# Patient Record
Sex: MALE | URBAN - METROPOLITAN AREA
[De-identification: names, ages, dates, MRNs, and addresses within clinical notes are randomized per-mention and may not be internally consistent; named-entity substitution may affect disease eponyms.]

---

## 2023-01-26 ENCOUNTER — APPOINTMENT (OUTPATIENT)
Dept: URBAN - METROPOLITAN AREA CLINIC 245 | Age: 73
Setting detail: DERMATOLOGY
End: 2023-01-27

## 2023-01-26 DIAGNOSIS — L30.0 NUMMULAR DERMATITIS: ICD-10-CM

## 2023-01-26 DIAGNOSIS — L82.1 OTHER SEBORRHEIC KERATOSIS: ICD-10-CM

## 2023-01-26 DIAGNOSIS — L57.8 OTHER SKIN CHANGES DUE TO CHRONIC EXPOSURE TO NONIONIZING RADIATION: ICD-10-CM

## 2023-01-26 DIAGNOSIS — D49.2 NEOPLASM OF UNSPECIFIED BEHAVIOR OF BONE, SOFT TISSUE, AND SKIN: ICD-10-CM

## 2023-01-26 DIAGNOSIS — M71 OTHER BURSOPATHIES: ICD-10-CM

## 2023-01-26 DIAGNOSIS — D18.0 HEMANGIOMA: ICD-10-CM

## 2023-01-26 PROBLEM — M71.372 OTHER BURSAL CYST, LEFT ANKLE AND FOOT: Status: ACTIVE | Noted: 2023-01-26

## 2023-01-26 PROBLEM — D18.01 HEMANGIOMA OF SKIN AND SUBCUTANEOUS TISSUE: Status: ACTIVE | Noted: 2023-01-26

## 2023-01-26 PROCEDURE — OTHER SHAVE REMOVAL: OTHER

## 2023-01-26 PROCEDURE — 99204 OFFICE O/P NEW MOD 45 MIN: CPT | Mod: 25

## 2023-01-26 PROCEDURE — OTHER COUNSELING: OTHER

## 2023-01-26 PROCEDURE — OTHER MEDICATION COUNSELING: OTHER

## 2023-01-26 PROCEDURE — A4550 SURGICAL TRAYS: HCPCS

## 2023-01-26 PROCEDURE — OTHER PRESCRIPTION: OTHER

## 2023-01-26 PROCEDURE — OTHER MIPS QUALITY: OTHER

## 2023-01-26 PROCEDURE — 11310 SHAVE SKIN LESION 0.5 CM/<: CPT

## 2023-01-26 PROCEDURE — 11311 SHAVE SKIN LESION 0.6-1.0 CM: CPT

## 2023-01-26 RX ORDER — TRIAMCINOLONE ACETONIDE 1 MG/G
CREAM TOPICAL BID
Qty: 454 | Refills: 3 | Status: ERX | COMMUNITY
Start: 2023-01-26

## 2023-01-26 ASSESSMENT — LOCATION ZONE DERM
LOCATION ZONE: TRUNK
LOCATION ZONE: TOE
LOCATION ZONE: FACE
LOCATION ZONE: LEG

## 2023-01-26 ASSESSMENT — LOCATION DETAILED DESCRIPTION DERM
LOCATION DETAILED: INFERIOR MID FOREHEAD
LOCATION DETAILED: RIGHT LATERAL BUCCAL CHEEK
LOCATION DETAILED: RIGHT CENTRAL TEMPLE
LOCATION DETAILED: INFERIOR THORACIC SPINE
LOCATION DETAILED: RIGHT DISTAL CALF
LOCATION DETAILED: EPIGASTRIC SKIN
LOCATION DETAILED: LEFT DORSAL GREAT TOE

## 2023-01-26 ASSESSMENT — LOCATION SIMPLE DESCRIPTION DERM
LOCATION SIMPLE: RIGHT TEMPLE
LOCATION SIMPLE: RIGHT CALF
LOCATION SIMPLE: INFERIOR FOREHEAD
LOCATION SIMPLE: UPPER BACK
LOCATION SIMPLE: ABDOMEN
LOCATION SIMPLE: RIGHT CHEEK
LOCATION SIMPLE: LEFT GREAT TOE

## 2023-01-26 NOTE — PROCEDURE: SHAVE REMOVAL
Hemostasis: Aluminum Chloride and Electrocautery
Add Variable For Additional Medical Justification: No
Render Post-Care Instructions In Note?: yes
Biopsy Method: Dermablade
Size Of Lesion In Cm (Required): 0.6
Size Of Lesion In Cm (Required): 0.2
X Size Of Lesion In Cm (Optional): 0
Body Location Override (Optional - Billing Will Still Be Based On Selected Body Map Location If Applicable): right temple
Path Notes (To The Dermatopathologist): Check margins
Anesthesia Type: 1% lidocaine with epinephrine
Depth Of Shave: dermis
Consent was obtained from the patient. The risks and benefits to therapy were discussed in detail. Specifically, the risks of infection, scarring, bleeding, prolonged wound healing, incomplete removal, allergy to anesthesia, nerve injury and recurrence were addressed. Prior to the procedure, the treatment site was clearly identified and confirmed by the patient. All components of Universal Protocol/PAUSE Rule completed.
Detail Level: Detailed
Billing Type: Third-Party Bill
Medical Necessity Clause: This procedure was medically necessary because the lesion that was treated was:
Medical Necessity Information: It is in your best interest to select a reason for this procedure from the list below. All of these items fulfill various CMS LCD requirements except the new and changing color options.
Notification Instructions: Patient will be notified of pathology results which on average takes 2 weeks.
Wound Care: Petrolatum
Post-Care Instructions: I reviewed with the patient in detail post-care instructions. Patient is to keep the biopsy site dry overnight, and then apply Vaseline or Aquaphor twice daily until healed. Wash area daily with soap and water. Call with any concerns with the way the site looks or feels.
Anesthesia Volume In Cc: 1
Body Location Override (Optional - Billing Will Still Be Based On Selected Body Map Location If Applicable): right lower jawline

## 2023-03-06 ENCOUNTER — APPOINTMENT (OUTPATIENT)
Dept: URBAN - METROPOLITAN AREA CLINIC 245 | Age: 73
Setting detail: DERMATOLOGY
End: 2023-03-07

## 2023-03-06 DIAGNOSIS — L85.3 XEROSIS CUTIS: ICD-10-CM

## 2023-03-06 DIAGNOSIS — L30.0 NUMMULAR DERMATITIS: ICD-10-CM

## 2023-03-06 DIAGNOSIS — L82.0 INFLAMED SEBORRHEIC KERATOSIS: ICD-10-CM

## 2023-03-06 DIAGNOSIS — L738 OTHER SPECIFIED DISEASES OF HAIR AND HAIR FOLLICLES: ICD-10-CM

## 2023-03-06 DIAGNOSIS — L82.1 OTHER SEBORRHEIC KERATOSIS: ICD-10-CM

## 2023-03-06 DIAGNOSIS — M71 OTHER BURSOPATHIES: ICD-10-CM

## 2023-03-06 DIAGNOSIS — L663 OTHER SPECIFIED DISEASES OF HAIR AND HAIR FOLLICLES: ICD-10-CM

## 2023-03-06 PROBLEM — L02.222 FURUNCLE OF BACK [ANY PART, EXCEPT BUTTOCK]: Status: ACTIVE | Noted: 2023-03-06

## 2023-03-06 PROBLEM — M71.372 OTHER BURSAL CYST, LEFT ANKLE AND FOOT: Status: ACTIVE | Noted: 2023-03-06

## 2023-03-06 PROCEDURE — OTHER MEDICATION COUNSELING: OTHER

## 2023-03-06 PROCEDURE — 10060 I&D ABSCESS SIMPLE/SINGLE: CPT

## 2023-03-06 PROCEDURE — OTHER INCISION AND DRAINAGE: OTHER

## 2023-03-06 PROCEDURE — 99214 OFFICE O/P EST MOD 30 MIN: CPT | Mod: 25

## 2023-03-06 PROCEDURE — OTHER PRESCRIPTION: OTHER

## 2023-03-06 PROCEDURE — OTHER PRESCRIPTION MEDICATION MANAGEMENT: OTHER

## 2023-03-06 PROCEDURE — OTHER LIQUID NITROGEN: OTHER

## 2023-03-06 PROCEDURE — 17110 DESTRUCT B9 LESION 1-14: CPT

## 2023-03-06 PROCEDURE — OTHER COUNSELING: OTHER

## 2023-03-06 RX ORDER — CLINDAMYCIN PHOSPHATE 10 MG/ML
LOTION TOPICAL
Qty: 60 | Refills: 5 | Status: ERX | COMMUNITY
Start: 2023-03-06

## 2023-03-06 ASSESSMENT — LOCATION SIMPLE DESCRIPTION DERM
LOCATION SIMPLE: UPPER BACK
LOCATION SIMPLE: RIGHT UPPER BACK
LOCATION SIMPLE: RIGHT CALF
LOCATION SIMPLE: LOWER BACK
LOCATION SIMPLE: RIGHT ANTERIOR NECK
LOCATION SIMPLE: LEFT GREAT TOE

## 2023-03-06 ASSESSMENT — LOCATION DETAILED DESCRIPTION DERM
LOCATION DETAILED: LEFT DORSAL GREAT TOE
LOCATION DETAILED: RIGHT SUPERIOR LATERAL NECK
LOCATION DETAILED: SUPERIOR LUMBAR SPINE
LOCATION DETAILED: INFERIOR THORACIC SPINE
LOCATION DETAILED: RIGHT DISTAL CALF
LOCATION DETAILED: RIGHT SUPERIOR MEDIAL UPPER BACK

## 2023-03-06 ASSESSMENT — LOCATION ZONE DERM
LOCATION ZONE: LEG
LOCATION ZONE: TRUNK
LOCATION ZONE: TOE
LOCATION ZONE: NECK

## 2023-03-06 NOTE — PROCEDURE: MEDICATION COUNSELING
11-Dec-2018 16:06 Olumiant Pregnancy And Lactation Text: Based on animal studies, Olumiant may cause embryo-fetal harm when administered to pregnant women.  The medication should not be used in pregnancy.  Breastfeeding is not recommended during treatment.

## 2023-03-06 NOTE — PROCEDURE: INCISION AND DRAINAGE
Epidermal Closure: simple interrupted
Consent was obtained and risks were reviewed including but not limited to delayed wound healing, infection, need for multiple I and D's, and pain.
Render Postcare In Note?: Yes
Epidermal Sutures: 4-0 Ethilon
Anesthesia Volume In Cc: 0.5
Detail Level: Detailed
Drainage Type?: clear
Wound Care: Petrolatum
Curette Text (Optional): .
Method: 11 blade
Dressing: dry sterile dressing
Suture Text: The incision was partially closed with
Post-Care Instructions: I reviewed with the patient in detail post-care instructions. Patient should keep wound covered and call the office or return to clinic should any redness, pain, swelling or worsening occur.  Keep any recommended follow up.
Preparation Text: The area was prepped in the usual clean fashion.
Include Sutures?: No
Lesion Type: Blister
Size Of Lesion In Cm (Optional But May Be Required For Some Insurances): 0

## 2023-03-06 NOTE — PROCEDURE: COUNSELING
Detail Level: Zone
Detail Level: Simple
Patient Specific Counseling (Will Not Stick From Patient To Patient): Recommend to f/u with podiatrist
Detail Level: Generalized
Detail Level: Detailed

## 2023-03-06 NOTE — PROCEDURE: LIQUID NITROGEN
Show Topical Anesthesia Variable?: No
Render Post-Care Instructions In Note?: yes
Medical Necessity Clause: This procedure was medically necessary because the lesions that were treated were:
Application Tool (Optional): Cry-AC
Medical Necessity Information: It is in your best interest to select a reason for this procedure from the list below. All of these items fulfill various CMS LCD requirements except the new and changing color options.
Number Of Freeze-Thaw Cycles: 1 freeze-thaw cycle
Post-Care Instructions: I reviewed with the patient in detail post-care instructions. Patient is to wear sunprotection, and avoid picking at any of the treated lesions. Pt may apply Vaseline to crusted or scabbing areas.
Consent: The patient's consent was obtained including but not limited to risks of crusting, scabbing, blistering, scarring, darker or lighter pigmentary change, recurrence, incomplete removal and infection.
Duration Of Freeze Thaw-Cycle (Seconds): 3
Detail Level: Detailed
Spray Paint Text: The liquid nitrogen was applied to the skin utilizing a spray paint frosting technique.

## 2023-03-06 NOTE — PROCEDURE: PRESCRIPTION MEDICATION MANAGEMENT
Render In Strict Bullet Format?: No
Continue Regimen: OTC Unscented moisturizer QD
Detail Level: Zone
Continue Regimen: Clobetasol 0.05% ointment BID 2-3 weeks and then PRN per flares
